# Patient Record
(demographics unavailable — no encounter records)

---

## 2017-05-08 NOTE — C.PDOC
Time Seen by Provider: 05/08/17 13:57


Chief Complaint (Nursing): GI Problem


History Per: Patient


Onset/Duration Of Symptoms: Days (1)


Current Symptoms Are (Timing): Still Present


Context: Other (Daughter is sick contact)


Severity: Moderate


Location Of Pain/Discomfort: Epigastric


Radiation Of Pain To:: None


Quality Of Discomfort: Unable To Describe


Associated Symptoms: Nausea, Vomiting, Diarrhea


Alleviating Factors: None


Last Bowel Movement: Today


Recent travel outside of the [place]: No


Additional History Per: Prior Records





Past Medical History


Reviewed: Historical Data, Nursing Documentation, Vital Signs


Vital Signs: 





 Last Vital Signs











Temp  98.1 F   05/08/17 15:43


 


Pulse  77   05/08/17 15:43


 


Resp  18   05/08/17 15:43


 


BP  101/68   05/08/17 15:43


 


Pulse Ox  97   05/08/17 15:43














- Medical History


PMH: No Chronic Diseases, Gall Bladder Disease (gallstones)


Surgical History: No Surg Hx


Family History: States: Unknown Family Hx





- Social History


Hx Tobacco Use: No


Hx Alcohol Use: No


Hx Substance Use: No





- Immunization History


Hx Tetanus Toxoid Vaccination: No


Hx Influenza Vaccination: No


Hx Pneumococcal Vaccination: No





Review Of Systems


Except As Marked, All Systems Reviewed And Found Negative.


Constitutional: Negative for: Fever, Weakness


Cardiovascular: Negative for: Chest Pain


Respiratory: Negative for: Cough, Shortness of Breath


Gastrointestinal: Positive for: Nausea, Vomiting, Abdominal Pain, Diarrhea.  

Negative for: Melena, Hematochezia, Hematemesis


Genitourinary: Negative for: Dysuria


Musculoskeletal: Negative for: Neck Pain, Back Pain


Skin: Negative for: Rash


Neurological: Negative for: Weakness, Numbness, Seizures, Altered Mental Status

, Headache





Physical Exam





- Physical Exam


Appears: Non-toxic, No Acute Distress


Skin: Normal Color, Warm, Dry, No Rash


Head: Atraumatic, Normacephalic


Eye(s): bilateral: Normal Inspection, PERRL, EOMI


Oral Mucosa: Moist


Neck: Normal ROM, Supple


Cardiovascular: Rhythm Regular


Respiratory: Normal Breath Sounds, No Accessory Muscle Use


Gastrointestinal/Abdominal: Soft, No Tenderness, No Distention


Back: No CVA Tenderness


Extremity: Normal ROM


Neurological/Psych: Oriented x3, Normal Motor, Normal Sensation





ED Course And Treatment


O2 Sat by Pulse Oximetry: 97


Pulse Ox Interpretation: Normal


Progress Note: Pt feels much better and wants to go home. No abdominal pain or 

tenderness. Tolerating PO.


Reassessment Condition: Improved





Disposition


Counseled Patient/Family Regarding: Diagnosis, Need For Followup, Rx Given





- Disposition


Referrals: 


[facility] [Outside]


Disposition: HOME/ ROUTINE


Disposition Time: 16:07


Condition: IMPROVED


Additional Instructions: 


Drink plenty of fluids. Follow up with your doctor or in the clinic. Return to 

the ER if you develop fever, not tolerating fluids, worsening of symptoms or if 

you have any other concerns. 


Prescriptions: 


Bismuth Subsalicylate [Pepto Bismol] 2 tab PO Q1 PRN #16 ctb


 PRN Reason: Diarrhea


Famotidine [Pepcid] 20 mg PO BID #30 tab


Ondansetron [Zofran] 4 mg PO Q8H PRN #15 tab


 PRN Reason: Nausea/Vomiting


Instructions:  Gastroenteritis (ED)





- Clinical Impression


Clinical Impression: 


 Gastroenteritis